# Patient Record
Sex: FEMALE | Race: WHITE
[De-identification: names, ages, dates, MRNs, and addresses within clinical notes are randomized per-mention and may not be internally consistent; named-entity substitution may affect disease eponyms.]

---

## 2018-10-17 ENCOUNTER — HOSPITAL ENCOUNTER (EMERGENCY)
Dept: HOSPITAL 44 - ED | Age: 33
Discharge: HOME | End: 2018-10-17
Payer: COMMERCIAL

## 2018-10-17 VITALS — SYSTOLIC BLOOD PRESSURE: 128 MMHG | DIASTOLIC BLOOD PRESSURE: 72 MMHG

## 2018-10-17 DIAGNOSIS — G43.909: Primary | ICD-10-CM

## 2018-10-17 PROCEDURE — 99284 EMERGENCY DEPT VISIT MOD MDM: CPT

## 2018-10-17 PROCEDURE — 96375 TX/PRO/DX INJ NEW DRUG ADDON: CPT

## 2018-10-17 PROCEDURE — S1016 NON-PVC INTRAVENOUS ADMINIST: HCPCS

## 2018-10-17 PROCEDURE — 96365 THER/PROPH/DIAG IV INF INIT: CPT

## 2018-10-17 RX ADMIN — KETOROLAC TROMETHAMINE ONE MG: 30 INJECTION, SOLUTION INTRAMUSCULAR at 22:55

## 2018-10-17 RX ADMIN — RETINOL, ERGOCALCIFEROL, .ALPHA.-TOCOPHEROL ACETATE, DL-, PHYTONADIONE, ASCORBIC ACID, NIACINAMIDE, RIBOFLAVIN 5-PHOSPHATE SODIUM, THIAMINE HYDROCHLORIDE, PYRIDOXINE HYDROCHLORIDE, DEXPANTHENOL, BIOTIN, FOLIC ACID, AND CYANOCOBALAMIN ONE MLS/HR: KIT at 22:50

## 2018-10-17 RX ADMIN — ONDANSETRON ONE MG: 2 INJECTION INTRAMUSCULAR; INTRAVENOUS at 22:55

## 2018-10-17 NOTE — ED PHYSICIAN DOCUMENTATION
General Adult





- HISTORIAN


Historian: patient





- HPI


Stated Complaint: Migraine


Chief Complaint: General Adult


Onset: hours (3)


Timing: still present


Severity: moderate


Further Comments: yes (Pt is a 32 yo female with c/o migraine headache.)





- ROS


CONST: other (malaise)


EYES/ENT: other (photophobia)


CVS/RESP: none


GI/: vomiting, nausea


MS/SKIN/LYMPH: none


NEURO/PSYCH: headache





- PAST HX


Past History: other (migraine)


Surgeries/Procedures: other (tonsillectomy)


Allergies/Adverse Reactions: 


                                    Allergies











Allergy/AdvReac Type Severity Reaction Status Date / Time


 


No Known Allergies Allergy   Verified 10/17/18 22:41














Home Medications: 


                                Ambulatory Orders











 Medication  Instructions  Recorded


 


NK  10/17/18














- SOCIAL HX


Smoking History: cigarettes





- FAMILY HX


Family History: No





- VITAL SIGNS


Vital Signs: 





                                   Vital Signs











Temp Pulse Resp BP Pulse Ox


 


 97.2 F L  101 H  15   130/82   100 


 


 10/17/18 22:35  10/17/18 22:35  10/17/18 22:35  10/17/18 22:35  10/17/18 22:35














- REVIEWED ASSESSMENTS


Nursing Assessment  Reviewed: Yes


Vitals Reviewed: Yes





Progress





- Progress


Progress: 





NS 1 L IVF





Toradol 30 mg IV





Zofran 4 mg IV





Sx improved/resolved.





ED Results Lab/Radiology





- Orders


Orders: 





                                    ED Orders











 Category Date Time Status


 


 Place IV Lock 1T Care  10/17/18 22:42 Active


 


 0.9 % Sodium Chloride [Normal Saline] 1,000 ml Med  10/17/18 22:42 Active





 IV Q1H   


 


 Ketorolac Tromethamine [Toradol] Med  10/17/18 22:43 Discontinued





 30 mg IVP NOW ONE   


 


 Ondansetron HCl/Pf [Zofran 4 mg/2 ml] Med  10/17/18 22:42 Discontinued





 4 mg IVP NOW ONE   














General Adult Physical Exam





- PHYSICAL EXAM


GENERAL APPEARANCE: moderate distress


EENT: eye inspection normal, pharynx normal


NECK: normal inspection, supple


RESPIRATORY: no resp distress, chest non-tender, breath sounds normal


CVS: reg rate & rhythm, heart sounds normal


BACK: normal inspection


SKIN: warm/dry, normal color


EXTREMITIES: non-tender, normal range of motion, no evidence of injury, no edema


NEURO: oriented X3, CN's nml as tested, motor nml, sensation nml





Discharge


Clincal Impression: 


 migraine





Referrals: 


Primary Doctor,No [Primary Care Provider] - 


Condition: Good


Disposition: 01 HOME, SELF-CARE


Decision to Admit: NO


Decision Time: 23:31

## 2019-08-28 ENCOUNTER — HOSPITAL ENCOUNTER (EMERGENCY)
Dept: HOSPITAL 44 - ED | Age: 34
Discharge: HOME | End: 2019-08-28
Payer: COMMERCIAL

## 2019-08-28 VITALS — SYSTOLIC BLOOD PRESSURE: 130 MMHG | DIASTOLIC BLOOD PRESSURE: 77 MMHG

## 2019-08-28 DIAGNOSIS — K08.9: Primary | ICD-10-CM

## 2019-08-28 PROCEDURE — 99284 EMERGENCY DEPT VISIT MOD MDM: CPT

## 2019-08-28 PROCEDURE — 99283 EMERGENCY DEPT VISIT LOW MDM: CPT

## 2019-08-28 RX ADMIN — AMOXICILLIN AND CLAVULANATE POTASSIUM ONE EACH: 875; 125 TABLET, FILM COATED ORAL at 22:45

## 2019-08-28 NOTE — ED PHYSICIAN DOCUMENTATION
Sore Throat/Dental Pain





- HISTORIAN


Historian: patient





- HPI


Stated Complaint: dental pain


Chief Complaint: Dental Pain


Additional Information: 





Patient presents to ED with a 2 day history of left lower dental pain.  Patient 

reports the filling fell out 2 days ago. 


Onset: days ago (2)


Context: Fractured Tooth


Associated Symptoms: denies: fever, chills, sore throat


Worsened By: heat, cold





- ROS


CONST: no problems


CVS/RESP: denies: chest pain, shortness of breath


GI/: denies: nausea, vomiting


MS/SKIN/LYMPH: denies: muscle aches


NEURO/PSYCH: denies: headache





- PAST HX


Past History: none


Other History: none


Allergies/Adverse Reactions: 


                                    Allergies











Allergy/AdvReac Type Severity Reaction Status Date / Time


 


No Known Allergies Allergy   Verified 08/28/19 22:32














Home Medications: 


                                Ambulatory Orders











 Medication  Instructions  Recorded


 


Penicillin V Potassium [Pen V K] 500 mg PO TID #30 tablet 08/28/19


 


Tramadol HCl [Ultram] 50 mg PO QID PRN #15 tablet 08/28/19














- SOCIAL HX


Smoking History: non-smoker


Alcohol Use: none


Drug Use: none





- FAMILY HX


Family History: No





- VITAL SIGNS


Vital Signs: 





                                   Vital Signs











Temp Pulse Resp BP Pulse Ox


 


          128/72    


 


          10/17/18 23:35   














- REVIEWED ASSESSMENTS


Nursing Assessment  Reviewed: Yes


Vitals Reviewed: Yes





Dental Pain Physical Exam





- EXAM


General Appearance: no acute distress, alert


Head/Neck: no lymphadenopathy


Eyes: PERRL


Mouth/Throat: gums nml, dental tenderness (left lower)


Ear/Nose: nml inspection


Respiratory: no resp. distress, breath sounds nml


CVS: reg. rate & rhythm, heart sounds nml


Abdomen: soft, normal bowel sounds


Extremities: non-tender


Skin: warm/dry


Neuro/Psych: none





Discharge


Clincal Impression: 


 Pain, dental





Prescriptions: 


Penicillin V Potassium [Pen V K] 500 mg PO TID #30 tablet


Tramadol HCl [Ultram] 50 mg PO QID PRN #15 tablet


 PRN Reason: dental pain


Referrals: 


Primary Doctor,No [Primary Care Provider] - 2 Days


Additional Instructions: 


1.  Take antibiotics until gone


2.  Tramadol as needed for pain.  You may add tylenol and/or Ibuprofen as needed

to Tramadol for better pain control


3.  Mouth rinses after each meal or snack.  1 tsp salt and 1 tsp baking soda in 

4 ounces warm water


4.  Follow up with Dentist as soon as possible


5.  Return to ER for new or worsening symptoms


Condition: Stable


Disposition: 01 HOME, SELF-CARE


Decision to Admit: NO


Date of Decison to Admit: 08/28/19


Decision Time: 22:43